# Patient Record
Sex: MALE | Race: WHITE | NOT HISPANIC OR LATINO | Employment: FULL TIME | URBAN - METROPOLITAN AREA
[De-identification: names, ages, dates, MRNs, and addresses within clinical notes are randomized per-mention and may not be internally consistent; named-entity substitution may affect disease eponyms.]

---

## 2023-04-26 ENCOUNTER — OFFICE VISIT (OUTPATIENT)
Dept: PRIMARY CARE | Facility: CLINIC | Age: 33
End: 2023-04-26
Payer: COMMERCIAL

## 2023-04-26 VITALS
HEIGHT: 72 IN | HEART RATE: 73 BPM | OXYGEN SATURATION: 98 % | DIASTOLIC BLOOD PRESSURE: 78 MMHG | SYSTOLIC BLOOD PRESSURE: 136 MMHG | TEMPERATURE: 98.3 F | WEIGHT: 263.8 LBS | BODY MASS INDEX: 35.73 KG/M2 | RESPIRATION RATE: 16 BRPM

## 2023-04-26 DIAGNOSIS — E66.9 OBESITY (BMI 30-39.9): ICD-10-CM

## 2023-04-26 DIAGNOSIS — Z86.59 HISTORY OF ANXIETY: ICD-10-CM

## 2023-04-26 DIAGNOSIS — I10 HYPERTENSION, UNSPECIFIED TYPE: Primary | ICD-10-CM

## 2023-04-26 DIAGNOSIS — Z87.891 FORMER SMOKER: ICD-10-CM

## 2023-04-26 PROCEDURE — 3078F DIAST BP <80 MM HG: CPT | Performed by: NURSE PRACTITIONER

## 2023-04-26 PROCEDURE — 99204 OFFICE O/P NEW MOD 45 MIN: CPT | Performed by: NURSE PRACTITIONER

## 2023-04-26 PROCEDURE — 1036F TOBACCO NON-USER: CPT | Performed by: NURSE PRACTITIONER

## 2023-04-26 PROCEDURE — 3075F SYST BP GE 130 - 139MM HG: CPT | Performed by: NURSE PRACTITIONER

## 2023-04-26 RX ORDER — IBUPROFEN 800 MG/1
1 TABLET ORAL 3 TIMES DAILY PRN
COMMUNITY
Start: 2019-12-18 | End: 2023-04-26

## 2023-04-26 ASSESSMENT — ENCOUNTER SYMPTOMS
PSYCHIATRIC NEGATIVE: 1
CHILLS: 0
COUGH: 0
CHOKING: 0
LIGHT-HEADEDNESS: 0
STRIDOR: 0
BRUISES/BLEEDS EASILY: 0
SHORTNESS OF BREATH: 0
WEAKNESS: 0
PALPITATIONS: 0
FEVER: 0
UNEXPECTED WEIGHT CHANGE: 0
HEADACHES: 0
DIZZINESS: 0
APNEA: 0
CHEST TIGHTNESS: 0
CONSTITUTIONAL NEGATIVE: 1
NERVOUS/ANXIOUS: 0
ADENOPATHY: 0
WHEEZING: 0
DIAPHORESIS: 0
FATIGUE: 0

## 2023-04-26 NOTE — PROGRESS NOTES
Subjective   Patient ID: Pierce Schreiber V is a 32 y.o. male who presents for No chief complaint on file..    HPI   Patient in office for physical. Patient describes health as good. Patient has regular dental visits. Patient denies vision changes. Patient denies hearing loss.    Lifestyle: .... Patient reports diverse and healthy diet. Patient exercises regularly. Patient does not smoke; drinks alcohol occasionally. No drug use.    Male  Testicular cancer screening: Reviewed and current. Does monthly [facility]IAN[facility]  Prostate cancer screening/PSA: Reviewed and current.  Colon cancer screening: Reviewed and current.  Vision/hearing: Reviewed and current  Dexa:  AAA:     Immunizations:  TDaP: not sure, patient will check records; she will return to the office for a booster if she has raffi in the last 10 years  Shingles: patient declines; patient will get at pharmacy   Pneumonia:  Flu:  COVID:   Hep B:    Concerns:      Review of Systems    Objective   There were no vitals taken for this visit.    Physical Exam    Assessment/Plan   {Assess/PlanSmartLinks:49743}  Exam findings reviewed with patient. Health screens, lab work orders, immunizations, specialty provider follow-ups, referrals, and medications discussed. We will call with lab results and update the patient on the plan of care. Follow-up in 12 months for physical or earlier as needed.     Benefits of healthy lifestyle discussed: low carbohydrates/fat/sugar diet; use lean white instead of red meat; use several servings of fruit and vegetables daily; use whole grain flour instead of white flour products; cook at home frequently instead of eating out; exercise 30-90 minutes 5 x/week; good sleep hygiene; stress reduction and prevention strategies; avoid stimulants like caffeine and nicotine; avoid depressants like alcohol; maintain adequate support systems and positive relationships.

## 2023-04-26 NOTE — PROGRESS NOTES
Subjective   Patient ID: Pierce Schreiber V is a 32 y.o. male who presents for Hypertension.    HPI   Patient in office to reestablish care. History of hypertension. He was diagnosed with hypertension by his previous PCP in 2018 and started on Lisinopril, which he took for 2 years. He stopped following up with his PCP because of COVID 19. He was concurrently on Effexor for anxiety. He no longer has anxiety symptoms. The patient recently checked his pressure at home (168/101). 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 The patient has been working rotating shifts and has been having problems controlling his diet. He will have more regular work hours at his new job. The patient does not smoke (quit 2019) or take drugs. He rarely drinks alcohol. He does not take any other medications. The patient does not exercise much because his job is physically demanding. No other symptoms or concerns today.     Blood pressure: 138/82; 136/78 (manual repeats)    Review of Systems   Constitutional: Negative.  Negative for chills, diaphoresis, fatigue, fever and unexpected weight change.   HENT: Negative.  Negative for nosebleeds.    Eyes:  Negative for visual disturbance.   Respiratory:  Negative for apnea, cough, choking, chest tightness, shortness of breath, wheezing and stridor.    Cardiovascular:  Negative for chest pain, palpitations and leg swelling.   Neurological:  Negative for dizziness, syncope, weakness, light-headedness and headaches.   Hematological:  Negative for adenopathy. Does not bruise/bleed easily.   Psychiatric/Behavioral: Negative.  The patient is not nervous/anxious.        Objective   BP (!) 145/101   Pulse 73   Temp 36.8 °C (98.3 °F) (Temporal)   Resp 16   Ht 1.829 m (6')   Wt 120 kg (263 lb 12.8 oz)   SpO2 98%   BMI 35.78 kg/m²     Physical Exam  Constitutional:       Appearance: Normal appearance. He is obese.   HENT:      Head: Normocephalic.      Right Ear: Tympanic  membrane, ear canal and external ear normal.      Left Ear: Tympanic membrane, ear canal and external ear normal.   Eyes:      Conjunctiva/sclera: Conjunctivae normal.   Cardiovascular:      Rate and Rhythm: Normal rate and regular rhythm.      Pulses: Normal pulses.      Heart sounds: Normal heart sounds.   Pulmonary:      Effort: Pulmonary effort is normal.      Breath sounds: Normal breath sounds.   Musculoskeletal:      Right lower leg: No edema.      Left lower leg: No edema.   Skin:     General: Skin is warm.   Neurological:      General: No focal deficit present.      Mental Status: He is alert.      Gait: Gait normal.   Psychiatric:         Mood and Affect: Mood normal.         Judgment: Judgment normal.       Assessment/Plan     Exam findings reviewed with patient. Medication options  reviewed. Will hold off on medication treatment at this point. The patient will keep monitoring blood pressures daily and as needed at home; he will call the office with outliers or abnormal trends. Follow up in 1 month for reevaluation and fasting blood work or earlier as needed.    Benefits of healthy lifestyle reviewed: low carbohydrates/fat/sugar diet; use lean white instead of red meet; use several servings of fruit and vegetables daily; use whole grain flour instead of white flour products; cook at home frequently instead of eating out; exercise 30-90 minutes 5 x/week.

## 2023-05-01 LAB
LEAD, INDUSTRIAL, WHOLE BLOOD: 32.2 UG/DL
ZINC, PROTOPORPHYRIN  BLOOD: 119 UG/DL (ref 0–40)
ZINC, PROTOPORPHYRIN (ZPP) RATIO: 203 UMOLZPP/MOLHEM (ref 0–69)